# Patient Record
Sex: FEMALE | Race: BLACK OR AFRICAN AMERICAN | Employment: UNEMPLOYED | URBAN - METROPOLITAN AREA
[De-identification: names, ages, dates, MRNs, and addresses within clinical notes are randomized per-mention and may not be internally consistent; named-entity substitution may affect disease eponyms.]

---

## 2017-04-20 ENCOUNTER — OFFICE VISIT (OUTPATIENT)
Dept: OBGYN CLINIC | Facility: CLINIC | Age: 52
End: 2017-04-20

## 2017-04-20 VITALS
WEIGHT: 208 LBS | SYSTOLIC BLOOD PRESSURE: 122 MMHG | BODY MASS INDEX: 34.66 KG/M2 | HEIGHT: 65 IN | DIASTOLIC BLOOD PRESSURE: 74 MMHG

## 2017-04-20 DIAGNOSIS — Z01.419 WOMEN'S ANNUAL ROUTINE GYNECOLOGICAL EXAMINATION: Primary | ICD-10-CM

## 2017-04-20 PROBLEM — N63.20 LEFT BREAST MASS: Status: ACTIVE | Noted: 2017-04-20

## 2017-04-20 PROCEDURE — 88175 CYTOPATH C/V AUTO FLUID REDO: CPT | Performed by: OBSTETRICS & GYNECOLOGY

## 2017-04-20 PROCEDURE — 99396 PREV VISIT EST AGE 40-64: CPT | Performed by: OBSTETRICS & GYNECOLOGY

## 2017-04-20 PROCEDURE — 87624 HPV HI-RISK TYP POOLED RSLT: CPT | Performed by: OBSTETRICS & GYNECOLOGY

## 2017-04-20 RX ORDER — OMEPRAZOLE 40 MG/1
CAPSULE, DELAYED RELEASE ORAL
COMMUNITY
Start: 2017-02-28 | End: 2021-08-30

## 2017-04-20 RX ORDER — HYDROCHLOROTHIAZIDE 25 MG/1
TABLET ORAL
COMMUNITY
Start: 2017-04-09 | End: 2021-08-30

## 2017-04-20 NOTE — PROGRESS NOTES
Valery Obando is here for a checkup. She has no complaints. Patient has not had any vaginal spotting or bleeding since her menopause.   Her bone density scan last year was normal patient has had 2 colonoscopies so far first colonoscopy they found a polyp kaushik Exam     /74 mmHg  Ht 65\"  Wt 208 lb  BMI 34.61 kg/m2  LMP  (Exact Date)    GENERAL: well developed, well nourished, in no apparent distress  SKIN: no rashes, no suspicious lesions  HEENT: normal  NECK: supple; no thyroidmegaly, no adenopathy PM  4/20/2017

## 2017-05-03 ENCOUNTER — MED REC SCAN ONLY (OUTPATIENT)
Dept: OBGYN CLINIC | Facility: CLINIC | Age: 52
End: 2017-05-03

## 2017-08-07 ENCOUNTER — OFFICE VISIT (OUTPATIENT)
Dept: OTOLARYNGOLOGY | Facility: CLINIC | Age: 52
End: 2017-08-07

## 2017-08-07 VITALS
TEMPERATURE: 99 F | SYSTOLIC BLOOD PRESSURE: 127 MMHG | WEIGHT: 200 LBS | BODY MASS INDEX: 33.32 KG/M2 | DIASTOLIC BLOOD PRESSURE: 86 MMHG | HEIGHT: 65 IN

## 2017-08-07 DIAGNOSIS — K11.20 SIALOADENITIS OF SUBMANDIBULAR GLAND: Primary | ICD-10-CM

## 2017-08-07 PROCEDURE — 99212 OFFICE O/P EST SF 10 MIN: CPT | Performed by: OTOLARYNGOLOGY

## 2017-08-07 PROCEDURE — 99243 OFF/OP CNSLTJ NEW/EST LOW 30: CPT | Performed by: OTOLARYNGOLOGY

## 2017-08-07 NOTE — PROGRESS NOTES
Ganesh Anderson is a 46year old female. Patient presents with:  Lump: lump at her neck for a year      HISTORY OF PRESENT ILLNESS  She presents with a history of some thyroid issues with a subsequent blood work demonstrating an elevated ESR.   She was r Abdominal pain and diarrhea. Endocrine Negative Cold intolerance and heat intolerance. Neuro Negative Tremors. Psych Negative Anxiety and depression. Integumentary Negative Frequent skin infections, pigment change and rash.    Hema/Lymph Negative Ea Release, , Disp: , Rfl:   •  hydrochlorothiazide 25 MG Oral Tab, , Disp: , Rfl:   •  Cholecalciferol ( ULTRA STRENGTH) 2000 units Oral Cap, Take 2,000 Units by mouth., Disp: , Rfl:   ASSESSMENT AND PLAN    1.  Sialoadenitis of submandibular gland  I di

## 2017-08-07 NOTE — PATIENT INSTRUCTIONS
Salivary Gland Swelling, Uncertain Cause  Salivary glands make saliva in response to food in your mouth. Saliva is mostly water. It also has minerals and proteins that help break down food and keep the mouth and teeth healthy.  There are three pairs of sa Follow up with your healthcare provider or as advised. See your healthcare provider for further exams and testing. If you have been referred to a specialist, make an appointment promptly.   When to seek medical advice  Call your healthcare provider if any o

## 2017-08-08 ENCOUNTER — TELEPHONE (OUTPATIENT)
Dept: OTOLARYNGOLOGY | Facility: CLINIC | Age: 52
End: 2017-08-08

## 2017-08-08 NOTE — TELEPHONE ENCOUNTER
Pt dropped off disc CT of neck w/o contrast & MRI of lumbar spine w/ without contrast. Both disc placed in JDO mailbox @ [facility].

## 2017-10-27 ENCOUNTER — TELEPHONE (OUTPATIENT)
Dept: OTOLARYNGOLOGY | Facility: CLINIC | Age: 52
End: 2017-10-27

## 2017-10-27 NOTE — TELEPHONE ENCOUNTER
appt 8-7-17 with dr Patricia Roth. Pt dropped off a cd with results of the cat scan. Pt had the blood test.   Office was calling dr Humaira Barlow for results. Office would call pt after all results were received. Pt has not received a call back.   Why?

## 2017-10-30 NOTE — TELEPHONE ENCOUNTER
Janet Clay, please see note below, do you receive pt information at [facility] back in August. Please advise

## 2017-10-30 NOTE — TELEPHONE ENCOUNTER
You should ask Macarena if the CDs are in the drawer they have there. I do not recall seeing them or having them shown to me by the nurse at [facility].   I do not even know where I HAVE A BOX SO ALL SCANS LEFT BY PATIENTS ARE MANAGED BY THE NURSING STAFF. iF SCANS A

## 2017-11-04 NOTE — TELEPHONE ENCOUNTER
I reviewed her scan images, her ultrasound and CT reports which suggest some lymphadenopathy which is completely benign in appearance on the left side and all of her labs appeared to be rather normal.  Previous biopsy of her thyroid nodule was benign.   If

## 2017-11-06 NOTE — TELEPHONE ENCOUNTER
Pt informed of results and recommendations per JDO. Letter faxed to Dr. Daniel Lopez per pt's request.  Pt verbalized understanding.

## 2018-08-17 ENCOUNTER — OFFICE VISIT (OUTPATIENT)
Dept: OBGYN CLINIC | Facility: CLINIC | Age: 53
End: 2018-08-17
Payer: COMMERCIAL

## 2018-08-17 VITALS
WEIGHT: 216 LBS | HEIGHT: 65 IN | BODY MASS INDEX: 35.99 KG/M2 | DIASTOLIC BLOOD PRESSURE: 74 MMHG | SYSTOLIC BLOOD PRESSURE: 122 MMHG

## 2018-08-17 DIAGNOSIS — Z01.419 WOMEN'S ANNUAL ROUTINE GYNECOLOGICAL EXAMINATION: Primary | ICD-10-CM

## 2018-08-17 PROBLEM — K63.5 COLON POLYP: Status: ACTIVE | Noted: 2018-08-17

## 2018-08-17 PROBLEM — Z78.0 MENOPAUSE: Status: ACTIVE | Noted: 2018-08-17

## 2018-08-17 PROCEDURE — 99396 PREV VISIT EST AGE 40-64: CPT | Performed by: OBSTETRICS & GYNECOLOGY

## 2018-08-17 PROCEDURE — 87624 HPV HI-RISK TYP POOLED RSLT: CPT | Performed by: OBSTETRICS & GYNECOLOGY

## 2018-08-17 NOTE — PROGRESS NOTES
Venkat Cisneros is here for checkup. She has no gynecologic complaints. Patient says that occasionally she gets lower abdominal pain and she checked with her primary care physician a month ago and she feels it may be bowel related, constipation.   She denies any Years of education: N/A  Number of children: N/A     Occupational History  None on file     Social History Main Topics   Smoking status: Current Every Day Smoker     Smokeless tobacco: Current User    Alcohol use No    Drug use: Unknown     Other Topics Co

## 2018-08-20 ENCOUNTER — OFFICE VISIT (OUTPATIENT)
Dept: OTOLARYNGOLOGY | Facility: CLINIC | Age: 53
End: 2018-08-20
Payer: COMMERCIAL

## 2018-08-20 VITALS
HEIGHT: 65 IN | TEMPERATURE: 98 F | DIASTOLIC BLOOD PRESSURE: 73 MMHG | SYSTOLIC BLOOD PRESSURE: 110 MMHG | WEIGHT: 218 LBS | BODY MASS INDEX: 36.32 KG/M2

## 2018-08-20 DIAGNOSIS — J35.1 TONSILLAR HYPERTROPHY: Primary | ICD-10-CM

## 2018-08-20 LAB — HPV I/H RISK 1 DNA SPEC QL NAA+PROBE: NEGATIVE

## 2018-08-20 PROCEDURE — 99214 OFFICE O/P EST MOD 30 MIN: CPT | Performed by: OTOLARYNGOLOGY

## 2018-08-20 PROCEDURE — 99212 OFFICE O/P EST SF 10 MIN: CPT | Performed by: OTOLARYNGOLOGY

## 2018-08-20 RX ORDER — LORATADINE 10 MG/1
10 TABLET ORAL DAILY
Qty: 30 TABLET | Refills: 3 | Status: SHIPPED | OUTPATIENT
Start: 2018-08-20 | End: 2021-06-16

## 2018-08-20 RX ORDER — AZELASTINE 1 MG/ML
2 SPRAY, METERED NASAL 2 TIMES DAILY
Qty: 1 BOTTLE | Refills: 0 | Status: SHIPPED | OUTPATIENT
Start: 2018-08-20 | End: 2021-06-16

## 2018-08-20 RX ORDER — MONTELUKAST SODIUM 10 MG/1
10 TABLET ORAL NIGHTLY
Qty: 30 TABLET | Refills: 3 | Status: SHIPPED | OUTPATIENT
Start: 2018-08-20 | End: 2019-07-23

## 2018-08-20 NOTE — PROGRESS NOTES
Monika Cleaning is a 45year old female. Patient presents with: Tonsil Problem: enlarged tonsils       HISTORY OF PRESENT ILLNESS  She presents with a history of some thyroid issues with a subsequent blood work demonstrating an elevated ESR.   She was r Drug use:  No                Family History   Problem Relation Age of Onset   • Hypertension Father    • Diabetes Mother    • Hypertension Mother        Past Medical History:   Diagnosis Date   • Essential hypertension    • Fibroids    • H/O pino Normal.   Skin Normal Inspection - Normal.        Lymph Detail Normal Submental. Submandibular. Anterior cervical. Posterior cervical. Supraclavicular.         Nose/Mouth/Throat Normal External nose - Normal. Lips/teeth/gums - Normal. Tonsils -3+ oropharynx

## 2018-12-24 ENCOUNTER — TELEPHONE (OUTPATIENT)
Dept: OTOLARYNGOLOGY | Facility: CLINIC | Age: 53
End: 2018-12-24

## 2018-12-24 NOTE — TELEPHONE ENCOUNTER
Dr Estuardo Coleman please see note below,  last visit on 8/20/18 for tonsillar hypertrophy  Request for refill,pt is due for follow up,please advise.

## 2018-12-24 NOTE — TELEPHONE ENCOUNTER
•  Montelukast Sodium 10 MG Oral Tab, Take 1 tablet (10 mg total) by mouth nightly., Disp: 30 tablet, Rfl: 3    RX refill received from [address] [place] [address]: 8-20-18

## 2018-12-26 RX ORDER — MONTELUKAST SODIUM 10 MG/1
10 TABLET ORAL NIGHTLY
Qty: 30 TABLET | Refills: 3 | Status: SHIPPED | OUTPATIENT
Start: 2018-12-26 | End: 2019-07-23

## 2019-07-23 ENCOUNTER — OFFICE VISIT (OUTPATIENT)
Dept: OBGYN CLINIC | Facility: CLINIC | Age: 54
End: 2019-07-23
Payer: COMMERCIAL

## 2019-07-23 VITALS
SYSTOLIC BLOOD PRESSURE: 116 MMHG | BODY MASS INDEX: 37.65 KG/M2 | WEIGHT: 226 LBS | HEIGHT: 65 IN | DIASTOLIC BLOOD PRESSURE: 70 MMHG

## 2019-07-23 DIAGNOSIS — Z01.419 WOMEN'S ANNUAL ROUTINE GYNECOLOGICAL EXAMINATION: Primary | ICD-10-CM

## 2019-07-23 DIAGNOSIS — R10.2 PELVIC PAIN IN FEMALE: ICD-10-CM

## 2019-07-23 PROCEDURE — 99396 PREV VISIT EST AGE 40-64: CPT | Performed by: OBSTETRICS & GYNECOLOGY

## 2019-07-23 PROCEDURE — 87624 HPV HI-RISK TYP POOLED RSLT: CPT | Performed by: OBSTETRICS & GYNECOLOGY

## 2019-07-23 RX ORDER — LINACLOTIDE 145 UG/1
CAPSULE, GELATIN COATED ORAL
Refills: 0 | COMMUNITY
Start: 2019-07-15 | End: 2021-08-30

## 2019-07-23 RX ORDER — B-COMPLEX WITH VITAMIN C
1 TABLET ORAL
COMMUNITY

## 2019-07-24 LAB — HPV I/H RISK 1 DNA SPEC QL NAA+PROBE: NEGATIVE

## 2019-07-29 ENCOUNTER — TELEPHONE (OUTPATIENT)
Dept: OBGYN CLINIC | Facility: CLINIC | Age: 54
End: 2019-07-29

## 2019-08-06 NOTE — TELEPHONE ENCOUNTER
Patient last visit on 8/20/18 tonsillar hypertrophy request for refll ,last note pt is due for follow up,please advise.

## 2019-08-09 RX ORDER — MONTELUKAST SODIUM 10 MG/1
TABLET ORAL
Qty: 30 TABLET | Refills: 0 | OUTPATIENT
Start: 2019-08-09

## 2020-02-08 ENCOUNTER — HOSPITAL ENCOUNTER (OUTPATIENT)
Age: 55
Discharge: HOME | End: 2020-02-08
Attending: FAMILY MEDICINE
Payer: COMMERCIAL

## 2020-02-08 ENCOUNTER — APPOINTMENT (OUTPATIENT)
Dept: GENERAL RADIOLOGY | Age: 55
End: 2020-02-08
Attending: FAMILY MEDICINE
Payer: COMMERCIAL

## 2020-02-08 VITALS
DIASTOLIC BLOOD PRESSURE: 76 MMHG | TEMPERATURE: 100 F | HEART RATE: 117 BPM | SYSTOLIC BLOOD PRESSURE: 129 MMHG | OXYGEN SATURATION: 100 % | RESPIRATION RATE: 16 BRPM

## 2020-02-08 DIAGNOSIS — J11.1 INFLUENZA: Primary | ICD-10-CM

## 2020-02-08 LAB
POCT INFLUENZA A: POSITIVE
POCT INFLUENZA B: NEGATIVE

## 2020-02-08 PROCEDURE — 99204 OFFICE O/P NEW MOD 45 MIN: CPT

## 2020-02-08 PROCEDURE — 71046 X-RAY EXAM CHEST 2 VIEWS: CPT | Performed by: FAMILY MEDICINE

## 2020-02-08 PROCEDURE — 87502 INFLUENZA DNA AMP PROBE: CPT | Performed by: FAMILY MEDICINE

## 2020-02-08 PROCEDURE — 99213 OFFICE O/P EST LOW 20 MIN: CPT

## 2020-02-08 RX ORDER — OSELTAMIVIR PHOSPHATE 75 MG/1
75 CAPSULE ORAL 2 TIMES DAILY
Qty: 10 CAPSULE | Refills: 0 | Status: SHIPPED | OUTPATIENT
Start: 2020-02-08 | End: 2020-02-13

## 2020-02-08 NOTE — ED PROVIDER NOTES
Patient Seen in: [address]      History   Patient presents with:  Cough/URI    Stated Complaint: cold symps, bodyache     HPI    62yo F presents to IC with a cough x 1 week and myalgias and rhinorrhea yesterday.   Deidre Silverio Left Ear: Tympanic membrane and ear canal normal.      Nose: Mucosal edema, congestion and rhinorrhea present. Right Sinus: No maxillary sinus tenderness or frontal sinus tenderness.       Left Sinus: No maxillary sinus tenderness or frontal sinus tend CARDIAC/MEDIAST: Borderline cardiomegaly.  No significant vascular congestion. LUNGS/PLEURA: Mild linear opacity in the left lung base.  No pleural effusion.  No focal opacity in the right lung.    BONES: Osseous structures are intact. OTHER: Negative.

## 2020-02-08 NOTE — ED NOTES
Pt discharged home, all questions were answered, advised patient to start tamiflu today , to drink plenty of fluids and to follow up with pcp is symptoms not improving. Pt agreeable.

## 2020-08-17 ENCOUNTER — OFFICE VISIT (OUTPATIENT)
Dept: OBGYN CLINIC | Facility: CLINIC | Age: 55
End: 2020-08-17
Payer: COMMERCIAL

## 2020-08-17 VITALS
WEIGHT: 220.63 LBS | BODY MASS INDEX: 36.76 KG/M2 | SYSTOLIC BLOOD PRESSURE: 120 MMHG | DIASTOLIC BLOOD PRESSURE: 60 MMHG | HEIGHT: 65 IN

## 2020-08-17 DIAGNOSIS — Z01.419 WOMEN'S ANNUAL ROUTINE GYNECOLOGICAL EXAMINATION: Primary | ICD-10-CM

## 2020-08-17 DIAGNOSIS — Z12.4 SCREENING FOR MALIGNANT NEOPLASM OF THE CERVIX: ICD-10-CM

## 2020-08-17 PROBLEM — E04.2 MULTINODULAR GOITER: Status: ACTIVE | Noted: 2017-02-15

## 2020-08-17 PROBLEM — I10 ESSENTIAL HYPERTENSION: Status: ACTIVE | Noted: 2017-11-18

## 2020-08-17 PROCEDURE — 3008F BODY MASS INDEX DOCD: CPT | Performed by: OBSTETRICS & GYNECOLOGY

## 2020-08-17 PROCEDURE — 3078F DIAST BP <80 MM HG: CPT | Performed by: OBSTETRICS & GYNECOLOGY

## 2020-08-17 PROCEDURE — 3074F SYST BP LT 130 MM HG: CPT | Performed by: OBSTETRICS & GYNECOLOGY

## 2020-08-17 PROCEDURE — 87624 HPV HI-RISK TYP POOLED RSLT: CPT | Performed by: OBSTETRICS & GYNECOLOGY

## 2020-08-17 PROCEDURE — 99396 PREV VISIT EST AGE 40-64: CPT | Performed by: OBSTETRICS & GYNECOLOGY

## 2020-08-17 RX ORDER — MULTIVIT-MIN/IRON/FOLIC ACID/K 18-600-40
500 CAPSULE ORAL DAILY
COMMUNITY

## 2020-08-17 NOTE — PROGRESS NOTES
James Mary is here for a checkup. She has no gynecologic complaints. She has not had any vaginal spotting or bleeding. Her mammogram end of last year was normal given a prescription for mammogram this year.   She is not due for colonoscopy for another 5 ye 8/17/2020 ) 30 tablet 3   • Azelastine HCl 0.1 % Nasal Solution 2 sprays by Nasal route 2 (two) times daily.  (Patient not taking: Reported on 8/17/2020 ) 1 Bottle 0       Family History     Family History   Problem Relation Age of Onset   • Hypertension Fa rashes, no suspicious lesions  HEENT: normal  NECK: supple; no thyroidmegaly, no adenopathy  LUNGS: clear to auscultation  CARDIOVASCULAR: normal S1, S2, RRR  BREASTS: Bilaterally normal firm, nontendder, no palpable masses or nodes, no nipple discharge, n

## 2020-08-18 LAB — HPV I/H RISK 1 DNA SPEC QL NAA+PROBE: NEGATIVE

## 2020-11-30 ENCOUNTER — TELEPHONE (OUTPATIENT)
Dept: FAMILY MEDICINE CLINIC | Facility: CLINIC | Age: 55
End: 2020-11-30

## 2021-06-16 ENCOUNTER — MEDICAL CORRESPONDENCE (OUTPATIENT)
Dept: OBGYN CLINIC | Facility: CLINIC | Age: 56
End: 2021-06-16

## 2021-06-16 ENCOUNTER — MED REC SCAN ONLY (OUTPATIENT)
Dept: OBGYN CLINIC | Facility: CLINIC | Age: 56
End: 2021-06-16

## 2021-06-16 ENCOUNTER — OFFICE VISIT (OUTPATIENT)
Dept: OBGYN CLINIC | Facility: CLINIC | Age: 56
End: 2021-06-16

## 2021-06-16 VITALS
DIASTOLIC BLOOD PRESSURE: 72 MMHG | SYSTOLIC BLOOD PRESSURE: 122 MMHG | BODY MASS INDEX: 37.32 KG/M2 | WEIGHT: 224 LBS | HEIGHT: 65 IN

## 2021-06-16 DIAGNOSIS — Z01.419 WOMEN'S ANNUAL ROUTINE GYNECOLOGICAL EXAMINATION: Primary | ICD-10-CM

## 2021-06-16 PROBLEM — G47.33 OBSTRUCTIVE SLEEP APNEA (ADULT) (PEDIATRIC): Status: ACTIVE | Noted: 2017-09-14

## 2021-06-16 PROCEDURE — 87624 HPV HI-RISK TYP POOLED RSLT: CPT | Performed by: OBSTETRICS & GYNECOLOGY

## 2021-06-16 PROCEDURE — 3078F DIAST BP <80 MM HG: CPT | Performed by: OBSTETRICS & GYNECOLOGY

## 2021-06-16 PROCEDURE — 88175 CYTOPATH C/V AUTO FLUID REDO: CPT | Performed by: OBSTETRICS & GYNECOLOGY

## 2021-06-16 PROCEDURE — 3008F BODY MASS INDEX DOCD: CPT | Performed by: OBSTETRICS & GYNECOLOGY

## 2021-06-16 PROCEDURE — 99396 PREV VISIT EST AGE 40-64: CPT | Performed by: OBSTETRICS & GYNECOLOGY

## 2021-06-16 PROCEDURE — 3074F SYST BP LT 130 MM HG: CPT | Performed by: OBSTETRICS & GYNECOLOGY

## 2021-06-16 RX ORDER — FERROUS SULFATE 325(65) MG
325 TABLET ORAL DAILY
COMMUNITY
End: 2021-08-30

## 2021-06-16 NOTE — PROGRESS NOTES
Mary Calixto is here for a checkup. She has no gynecologic complaints. She has not had any vaginal spotting or bleeding. Patient says that she had mammogram at [facility] in October of last year.   She is scheduled to have mammogram at [facility] • Diabetes Mother    • Hypertension Mother        Social History     Social History    Socioeconomic History      Marital status: Single      Spouse name: Not on file      Number of children: Not on file      Years of education: Not on file      Highest nontendder, no palpable masses or nodes, no nipple discharge, no skin changes, no axillary adenopathy  ABDOMEN: Soft, non distended; non tender, no masses  GYNE/: External Genitalia: Normal appearing, no lesions.  Urethral meatus appear wnl, no abnormal d

## 2021-06-21 ENCOUNTER — APPOINTMENT (OUTPATIENT)
Dept: GENERAL RADIOLOGY | Age: 56
End: 2021-06-21
Attending: NURSE PRACTITIONER
Payer: COMMERCIAL

## 2021-06-21 ENCOUNTER — HOSPITAL ENCOUNTER (OUTPATIENT)
Age: 56
Discharge: HOME | End: 2021-06-21
Payer: COMMERCIAL

## 2021-06-21 VITALS
SYSTOLIC BLOOD PRESSURE: 145 MMHG | TEMPERATURE: 98 F | HEART RATE: 71 BPM | RESPIRATION RATE: 17 BRPM | DIASTOLIC BLOOD PRESSURE: 77 MMHG | OXYGEN SATURATION: 100 %

## 2021-06-21 DIAGNOSIS — S46.911A SHOULDER STRAIN, RIGHT, INITIAL ENCOUNTER: Primary | ICD-10-CM

## 2021-06-21 PROCEDURE — 73030 X-RAY EXAM OF SHOULDER: CPT | Performed by: NURSE PRACTITIONER

## 2021-06-21 PROCEDURE — 99203 OFFICE O/P NEW LOW 30 MIN: CPT | Performed by: NURSE PRACTITIONER

## 2021-06-21 NOTE — ED PROVIDER NOTES
Patient Seen in: [facility]    History   CC: shoulder pain  HPI: Rayo North 60year old female  who presents c/o right-sided shoulder pain which has been intermittent in severity in nature status post injury 5 months ago in which she tr Cholecalciferol ( ULTRA STRENGTH) 2000 units Oral Cap,  Take 2,000 Units by mouth. Constitutional and vital signs reviewed.         Physical Exam     ED Triage Vitals [06/21/21 1624]   /77   Pulse 71   Resp 17   Temp 97.8 °F (36.6 °C) Interactive and appropriate      ED Course   Labs Reviewed - No data to display    MDM     XR SHOULDER, COMPLETE (MIN 2 VIEWS), RIGHT (CPT=73030) (Final result)  Result time 06/21/21 17:14:40  Final result by Erinn Casey MD (06/21/21 17:14:40) in 2 days        Medications Prescribed:  Current Discharge Medication List

## 2021-06-21 NOTE — ED INITIAL ASSESSMENT (HPI)
Pt here with complaints of right shoulder pain,pt states she had a fall 5 months ago where she tripped and landed on her right shoulder pt states she was fine then but now has pain when moving her shoulder and has limited rom and feels it has worsened this

## 2021-08-30 ENCOUNTER — LAB ENCOUNTER (OUTPATIENT)
Dept: LAB | Facility: REFERENCE LAB | Age: 56
End: 2021-08-30
Attending: FAMILY MEDICINE
Payer: COMMERCIAL

## 2021-08-30 ENCOUNTER — LAB ENCOUNTER (OUTPATIENT)
Dept: LAB | Age: 56
End: 2021-08-30
Attending: FAMILY MEDICINE
Payer: COMMERCIAL

## 2021-08-30 ENCOUNTER — OFFICE VISIT (OUTPATIENT)
Dept: FAMILY MEDICINE CLINIC | Facility: CLINIC | Age: 56
End: 2021-08-30

## 2021-08-30 VITALS
OXYGEN SATURATION: 99 % | HEART RATE: 73 BPM | BODY MASS INDEX: 36.99 KG/M2 | SYSTOLIC BLOOD PRESSURE: 132 MMHG | DIASTOLIC BLOOD PRESSURE: 80 MMHG | HEIGHT: 65 IN | WEIGHT: 222 LBS

## 2021-08-30 DIAGNOSIS — Z00.00 ENCOUNTER FOR ROUTINE ADULT HEALTH EXAMINATION WITHOUT ABNORMAL FINDINGS: Primary | ICD-10-CM

## 2021-08-30 DIAGNOSIS — I10 ESSENTIAL HYPERTENSION: ICD-10-CM

## 2021-08-30 DIAGNOSIS — E04.2 MULTINODULAR GOITER: ICD-10-CM

## 2021-08-30 DIAGNOSIS — Z23 NEED FOR VACCINATION: ICD-10-CM

## 2021-08-30 DIAGNOSIS — Z00.00 ROUTINE GENERAL MEDICAL EXAMINATION AT A HEALTH CARE FACILITY: Primary | ICD-10-CM

## 2021-08-30 DIAGNOSIS — E87.6 HYPOKALEMIA: ICD-10-CM

## 2021-08-30 DIAGNOSIS — Z00.00 ENCOUNTER FOR ROUTINE ADULT HEALTH EXAMINATION WITHOUT ABNORMAL FINDINGS: ICD-10-CM

## 2021-08-30 LAB
ALBUMIN SERPL-MCNC: 3.7 G/DL (ref 3.4–5)
ALBUMIN/GLOB SERPL: 0.9 {RATIO} (ref 1–2)
ALP LIVER SERPL-CCNC: 80 U/L
ALT SERPL-CCNC: 29 U/L
ANION GAP SERPL CALC-SCNC: 7 MMOL/L (ref 0–18)
AST SERPL-CCNC: 13 U/L (ref 15–37)
BASOPHILS # BLD AUTO: 0.05 X10(3) UL (ref 0–0.2)
BASOPHILS NFR BLD AUTO: 0.7 %
BILIRUB SERPL-MCNC: 0.6 MG/DL (ref 0.1–2)
BUN BLD-MCNC: 8 MG/DL (ref 7–18)
BUN/CREAT SERPL: 11 (ref 10–20)
CALCIUM BLD-MCNC: 9.1 MG/DL (ref 8.5–10.1)
CHLORIDE SERPL-SCNC: 111 MMOL/L (ref 98–112)
CHOLEST SMN-MCNC: 190 MG/DL (ref ?–200)
CO2 SERPL-SCNC: 24 MMOL/L (ref 21–32)
CREAT BLD-MCNC: 0.73 MG/DL
CREAT UR-SCNC: 117 MG/DL
DEPRECATED RDW RBC AUTO: 48.9 FL (ref 35.1–46.3)
EOSINOPHIL # BLD AUTO: 0.1 X10(3) UL (ref 0–0.7)
EOSINOPHIL NFR BLD AUTO: 1.5 %
ERYTHROCYTE [DISTWIDTH] IN BLOOD BY AUTOMATED COUNT: 14.7 % (ref 11–15)
EST. AVERAGE GLUCOSE BLD GHB EST-MCNC: 120 MG/DL (ref 68–126)
GLOBULIN PLAS-MCNC: 4.2 G/DL (ref 2.8–4.4)
GLUCOSE BLD-MCNC: 92 MG/DL (ref 70–99)
HBA1C MFR BLD HPLC: 5.8 % (ref ?–5.7)
HCT VFR BLD AUTO: 37.9 %
HCV AB SERPL QL IA: NONREACTIVE
HDLC SERPL-MCNC: 41 MG/DL (ref 40–59)
HGB BLD-MCNC: 12.6 G/DL
IMM GRANULOCYTES # BLD AUTO: 0.01 X10(3) UL (ref 0–1)
IMM GRANULOCYTES NFR BLD: 0.1 %
LDLC SERPL CALC-MCNC: 131 MG/DL (ref ?–100)
LYMPHOCYTES # BLD AUTO: 3.08 X10(3) UL (ref 1–4)
LYMPHOCYTES NFR BLD AUTO: 44.8 %
M PROTEIN MFR SERPL ELPH: 7.9 G/DL (ref 6.4–8.2)
MCH RBC QN AUTO: 30.2 PG (ref 26–34)
MCHC RBC AUTO-ENTMCNC: 33.2 G/DL (ref 31–37)
MCV RBC AUTO: 90.9 FL
MICROALBUMIN UR-MCNC: 0.98 MG/DL
MICROALBUMIN/CREAT 24H UR-RTO: 8.4 UG/MG (ref ?–30)
MONOCYTES # BLD AUTO: 0.58 X10(3) UL (ref 0.1–1)
MONOCYTES NFR BLD AUTO: 8.4 %
NEUTROPHILS # BLD AUTO: 3.06 X10 (3) UL (ref 1.5–7.7)
NEUTROPHILS # BLD AUTO: 3.06 X10(3) UL (ref 1.5–7.7)
NEUTROPHILS NFR BLD AUTO: 44.5 %
NONHDLC SERPL-MCNC: 149 MG/DL (ref ?–130)
OSMOLALITY SERPL CALC.SUM OF ELEC: 292 MOSM/KG (ref 275–295)
PATIENT FASTING Y/N/NP: YES
PATIENT FASTING Y/N/NP: YES
PLATELET # BLD AUTO: 372 10(3)UL (ref 150–450)
POTASSIUM SERPL-SCNC: 3.2 MMOL/L (ref 3.5–5.1)
RBC # BLD AUTO: 4.17 X10(6)UL
SODIUM SERPL-SCNC: 142 MMOL/L (ref 136–145)
TRIGL SERPL-MCNC: 98 MG/DL (ref 30–149)
VLDLC SERPL CALC-MCNC: 18 MG/DL (ref 0–30)
WBC # BLD AUTO: 6.9 X10(3) UL (ref 4–11)

## 2021-08-30 PROCEDURE — 90471 IMMUNIZATION ADMIN: CPT | Performed by: FAMILY MEDICINE

## 2021-08-30 PROCEDURE — 90472 IMMUNIZATION ADMIN EACH ADD: CPT | Performed by: FAMILY MEDICINE

## 2021-08-30 PROCEDURE — 90732 PPSV23 VACC 2 YRS+ SUBQ/IM: CPT | Performed by: FAMILY MEDICINE

## 2021-08-30 PROCEDURE — 36415 COLL VENOUS BLD VENIPUNCTURE: CPT

## 2021-08-30 PROCEDURE — 90750 HZV VACC RECOMBINANT IM: CPT | Performed by: FAMILY MEDICINE

## 2021-08-30 PROCEDURE — 83036 HEMOGLOBIN GLYCOSYLATED A1C: CPT

## 2021-08-30 PROCEDURE — 3079F DIAST BP 80-89 MM HG: CPT | Performed by: FAMILY MEDICINE

## 2021-08-30 PROCEDURE — 86803 HEPATITIS C AB TEST: CPT

## 2021-08-30 PROCEDURE — 93005 ELECTROCARDIOGRAM TRACING: CPT

## 2021-08-30 PROCEDURE — 90715 TDAP VACCINE 7 YRS/> IM: CPT | Performed by: FAMILY MEDICINE

## 2021-08-30 PROCEDURE — G0438 PPPS, INITIAL VISIT: HCPCS | Performed by: FAMILY MEDICINE

## 2021-08-30 PROCEDURE — 3075F SYST BP GE 130 - 139MM HG: CPT | Performed by: FAMILY MEDICINE

## 2021-08-30 PROCEDURE — 93010 ELECTROCARDIOGRAM REPORT: CPT | Performed by: FAMILY MEDICINE

## 2021-08-30 PROCEDURE — 80053 COMPREHEN METABOLIC PANEL: CPT

## 2021-08-30 PROCEDURE — 99386 PREV VISIT NEW AGE 40-64: CPT | Performed by: FAMILY MEDICINE

## 2021-08-30 PROCEDURE — 82570 ASSAY OF URINE CREATININE: CPT

## 2021-08-30 PROCEDURE — 82043 UR ALBUMIN QUANTITATIVE: CPT

## 2021-08-30 PROCEDURE — 3008F BODY MASS INDEX DOCD: CPT | Performed by: FAMILY MEDICINE

## 2021-08-30 PROCEDURE — 85025 COMPLETE CBC W/AUTO DIFF WBC: CPT

## 2021-08-30 PROCEDURE — 80061 LIPID PANEL: CPT

## 2021-08-30 RX ORDER — POTASSIUM CHLORIDE 1500 MG/1
20 TABLET, FILM COATED, EXTENDED RELEASE ORAL DAILY
Qty: 90 TABLET | Refills: 1 | Status: SHIPPED | OUTPATIENT
Start: 2021-08-30

## 2021-08-30 RX ORDER — OMEPRAZOLE 40 MG/1
40 CAPSULE, DELAYED RELEASE ORAL DAILY
Qty: 90 CAPSULE | Refills: 1 | Status: SHIPPED | OUTPATIENT
Start: 2021-08-30

## 2021-08-30 RX ORDER — HYDROCHLOROTHIAZIDE 25 MG/1
25 TABLET ORAL DAILY
Qty: 90 TABLET | Refills: 1 | Status: SHIPPED | OUTPATIENT
Start: 2021-08-30

## 2021-08-30 NOTE — PATIENT INSTRUCTIONS

## 2021-08-30 NOTE — PROGRESS NOTES
HPI:   Mary Anne Landa is a 60year old female who presents for a complete physical exam.     Last pap: 6/2021 and normal   Last mammogram: 10/2020 at [facility]    Previous colonoscopy:  Within the last 10 years    History of STD's: None  History o No Known Allergies   Past Medical History:   Diagnosis Date   • Essential hypertension    • Fibroids    • H/O bone density study       Past Surgical History:   Procedure Laterality Date   • BREAST BIOPSY Left    • FOOT SURGERY Bilateral       Family Hi goiter  Need for vaccination  Orders Placed This Encounter      Microalb/Creat Ratio, Random Urine [E]      Lipid Panel [E]      Comp Metabolic Panel (14) [E]      Hemoglobin A1C (Glycohemoglobin) [E]      HCV Antibody [E]      CBC W Differential W Platele hydroCHLOROthiazide 25 MG Oral Tab 90 tablet 1     Sig: Take 1 tablet (25 mg total) by mouth daily. • Omeprazole 40 MG Oral Capsule Delayed Release 90 capsule 1     Sig: Take 1 capsule (40 mg total) by mouth daily.        Imaging & Consults:  [facility]OSTER VACC

## 2021-09-01 ENCOUNTER — HOSPITAL ENCOUNTER (OUTPATIENT)
Dept: ULTRASOUND IMAGING | Age: 56
Discharge: HOME | End: 2021-09-01
Attending: FAMILY MEDICINE
Payer: COMMERCIAL

## 2021-09-01 ENCOUNTER — MED REC SCAN ONLY (OUTPATIENT)
Dept: FAMILY MEDICINE CLINIC | Facility: CLINIC | Age: 56
End: 2021-09-01

## 2021-09-01 DIAGNOSIS — E04.2 MULTINODULAR GOITER: ICD-10-CM

## 2021-09-01 PROCEDURE — 76536 US EXAM OF HEAD AND NECK: CPT | Performed by: FAMILY MEDICINE

## 2021-09-02 ENCOUNTER — TELEPHONE (OUTPATIENT)
Dept: OBGYN CLINIC | Facility: CLINIC | Age: 56
End: 2021-09-02

## 2021-09-02 NOTE — TELEPHONE ENCOUNTER
This RN called pt back. Pt experiences vag bleeding when wiping after urination. Pt denies UTI symptoms. This RN scheduled pt with JN tomorrow (per Sheldon Harada ok to give SDA). Pt verbalized understanding and agrees with ROMA.     Ned Arora  to Dori Brown,

## 2021-09-02 NOTE — TELEPHONE ENCOUNTER
Patient was seen Monday and patient is having some blood in urine. Having blood now when she wipes. Patient sent [employer] message.

## 2021-09-03 ENCOUNTER — OFFICE VISIT (OUTPATIENT)
Dept: OBGYN CLINIC | Facility: CLINIC | Age: 56
End: 2021-09-03

## 2021-09-03 VITALS
SYSTOLIC BLOOD PRESSURE: 130 MMHG | BODY MASS INDEX: 36.49 KG/M2 | HEIGHT: 65 IN | WEIGHT: 219 LBS | DIASTOLIC BLOOD PRESSURE: 90 MMHG

## 2021-09-03 DIAGNOSIS — R10.2 PELVIC PAIN: ICD-10-CM

## 2021-09-03 DIAGNOSIS — N95.0 PMB (POSTMENOPAUSAL BLEEDING): Primary | ICD-10-CM

## 2021-09-03 PROCEDURE — 3075F SYST BP GE 130 - 139MM HG: CPT | Performed by: OBSTETRICS & GYNECOLOGY

## 2021-09-03 PROCEDURE — 88305 TISSUE EXAM BY PATHOLOGIST: CPT | Performed by: OBSTETRICS & GYNECOLOGY

## 2021-09-03 PROCEDURE — 58100 BIOPSY OF UTERUS LINING: CPT | Performed by: OBSTETRICS & GYNECOLOGY

## 2021-09-03 PROCEDURE — 99214 OFFICE O/P EST MOD 30 MIN: CPT | Performed by: OBSTETRICS & GYNECOLOGY

## 2021-09-03 PROCEDURE — 3080F DIAST BP >= 90 MM HG: CPT | Performed by: OBSTETRICS & GYNECOLOGY

## 2021-09-03 PROCEDURE — 3008F BODY MASS INDEX DOCD: CPT | Performed by: OBSTETRICS & GYNECOLOGY

## 2021-09-03 NOTE — PROGRESS NOTES
[facility]  Obstetrics and Gynecology  Follow Up    Subjective:     Singh Mccarthy is a 60year old [facility] female who presents with c/o Patient presents with:  Vaginal Bleeding: vag bleeding afterurination     First saw b of motion, strength 5/5, nontender without edema    Labs:  Reviewed normal paps from 2017 annually since.         Assessment:     Julio Cesar Umanzor is a 60year old  female who presents for PMB     Patient Active Problem List:     Left breast mass

## 2021-09-07 NOTE — PROCEDURES
Endometrial Biopsy    Pre-Procedure Care:   Consent was obtained. Procedure/risks were explained. Questions were answered. Correct patient was identified. Correct side and site were confirmed.     Description of Procedure:  Under satisfactory analgesia,

## 2021-09-17 ENCOUNTER — TELEPHONE (OUTPATIENT)
Dept: FAMILY MEDICINE CLINIC | Facility: CLINIC | Age: 56
End: 2021-09-17

## 2021-09-17 NOTE — TELEPHONE ENCOUNTER
Per pt, Otoniel Iniguez asked that she retrieve records from [facility].     Per pt, request for release of information needs to be sent to [place] [address] records department with the following information:    STAT MARIS    Name, , Info Needed, Provider Name, Thyroid US R

## 2021-09-20 NOTE — TELEPHONE ENCOUNTER
This RN explained to pt that medical records release form needs to be completed and faxed to [facility].  This RN emailed forms to [email or web address]; advised pt to send completed and signed fax via [employer] since pt is out of town and does not have access to a

## 2021-09-20 NOTE — TELEPHONE ENCOUNTER
Patient is calling back stating that she does not need to a sigh a release form. She stated that she did speak to someone in medical records. Can you please call back me back?

## 2021-10-04 NOTE — TELEPHONE ENCOUNTER
Rn received ultrasound records of abdomen from [facility]. RN had requested ultrasound of thyroid. RN attempted to contact number on return fax, no answer. RN contacted pt and informed of need to get images from ultrasound.  Pt states she is still out of to

## 2021-10-26 ENCOUNTER — MED REC SCAN ONLY (OUTPATIENT)
Dept: FAMILY MEDICINE CLINIC | Facility: CLINIC | Age: 56
End: 2021-10-26

## 2021-10-26 ENCOUNTER — NURSE ONLY (OUTPATIENT)
Dept: FAMILY MEDICINE CLINIC | Facility: CLINIC | Age: 56
End: 2021-10-26

## 2021-10-26 DIAGNOSIS — Z23 NEED FOR VACCINATION: Primary | ICD-10-CM

## 2021-10-26 PROCEDURE — 90471 IMMUNIZATION ADMIN: CPT | Performed by: FAMILY MEDICINE

## 2021-10-26 PROCEDURE — 90750 HZV VACC RECOMBINANT IM: CPT | Performed by: FAMILY MEDICINE

## 2021-10-27 ENCOUNTER — ULTRASOUND ENCOUNTER (OUTPATIENT)
Dept: OBGYN CLINIC | Facility: CLINIC | Age: 56
End: 2021-10-27

## 2021-10-27 DIAGNOSIS — N95.0 PMB (POSTMENOPAUSAL BLEEDING): ICD-10-CM

## 2021-10-27 PROCEDURE — 76856 US EXAM PELVIC COMPLETE: CPT | Performed by: OBSTETRICS & GYNECOLOGY

## 2021-10-27 PROCEDURE — 76830 TRANSVAGINAL US NON-OB: CPT | Performed by: OBSTETRICS & GYNECOLOGY

## 2021-10-28 NOTE — TELEPHONE ENCOUNTER
Patient brought in [address] of her thyroid. I am sending them to the [employer] for comparison.  Hopefully this is what they need as it is not a disc

## 2021-11-04 ENCOUNTER — TELEPHONE (OUTPATIENT)
Dept: FAMILY MEDICINE CLINIC | Facility: CLINIC | Age: 56
End: 2021-11-04

## 2021-11-04 NOTE — TELEPHONE ENCOUNTER
Luigi called from [facility] - stating they received records by  from our office of thyroid USN images. He states they were sent on paper and not on CD.  Also stated that it did not come with a report at all, and there are a lot of screwed up things that he

## 2021-11-04 NOTE — TELEPHONE ENCOUNTER
This RN reached out to [facility] and per [employer] were done in office and PSR will send msg to Dr. José Miguel Prasad office to find out how [address] [place] [address] can obtain US results on a disk as well as report. This RN informed pt as well as ALS.  Rush to call back office with up Features: hypoechoic, with hilar line present and no   significant vascularity    There is not cystic necrosis.    There are not calcifications.      There is NO adenopathy at the site of palpable abnormality of L   upper anterior cervical region at/just an

## 2021-11-08 ENCOUNTER — IMMUNIZATION (OUTPATIENT)
Dept: FAMILY MEDICINE CLINIC | Facility: CLINIC | Age: 56
End: 2021-11-08

## 2021-11-08 DIAGNOSIS — Z23 NEED FOR VACCINATION: Primary | ICD-10-CM

## 2021-11-08 PROCEDURE — 90471 IMMUNIZATION ADMIN: CPT | Performed by: FAMILY MEDICINE

## 2021-11-08 PROCEDURE — 90686 IIV4 VACC NO PRSV 0.5 ML IM: CPT | Performed by: FAMILY MEDICINE

## 2021-11-08 NOTE — TELEPHONE ENCOUNTER
Nik Yoo came in today for a FLU shot and she states she is just going to schedule the US at [facility] she has went back and forth with [facility] and now they are saying the office of Dr. Jessica Quezada does not even have the equipment to put images on a disc and th

## 2021-11-09 DIAGNOSIS — E04.1 THYROID NODULE: Primary | ICD-10-CM

## 2021-11-09 NOTE — TELEPHONE ENCOUNTER
Informed Teodora Cali of below. Teodora Cali verbalized understanding and agrees with POC.     Per Dolly Mcclain, Estefany Medina came in today for a FLU shot and she states she is just going to schedule the US at [facility] she has went back and forth with Aleksandr Dupree and now they ar

## 2021-11-17 ENCOUNTER — HOSPITAL ENCOUNTER (OUTPATIENT)
Dept: ULTRASOUND IMAGING | Age: 56
Discharge: HOME | End: 2021-11-17
Attending: FAMILY MEDICINE
Payer: COMMERCIAL

## 2021-11-17 DIAGNOSIS — E04.2 MULTIPLE THYROID NODULES: ICD-10-CM

## 2021-11-22 ENCOUNTER — LAB ENCOUNTER (OUTPATIENT)
Dept: LAB | Facility: REFERENCE LAB | Age: 56
End: 2021-11-22
Attending: FAMILY MEDICINE
Payer: COMMERCIAL

## 2021-11-22 ENCOUNTER — TELEPHONE (OUTPATIENT)
Dept: FAMILY MEDICINE CLINIC | Facility: CLINIC | Age: 56
End: 2021-11-22

## 2021-11-22 DIAGNOSIS — E87.6 HYPOKALEMIA: ICD-10-CM

## 2021-11-22 PROCEDURE — 80048 BASIC METABOLIC PNL TOTAL CA: CPT

## 2021-11-22 PROCEDURE — 36415 COLL VENOUS BLD VENIPUNCTURE: CPT

## 2021-11-22 NOTE — TELEPHONE ENCOUNTER
Letter placed and at the . Called pt and informed vaccine letter at . Per pt needs Lot # expiration date of current Flu vaccine and done, verbalized understanding.

## 2021-11-22 NOTE — TELEPHONE ENCOUNTER
Pt looking to  copy of immunizations, specially flu vaccination. Pt will be coming in for BMP blood draw today [facility] and would like to  information at  today if possible.

## 2021-12-03 ENCOUNTER — LAB ENCOUNTER (OUTPATIENT)
Dept: LAB | Facility: HOSPITAL | Age: 56
End: 2021-12-03
Attending: FAMILY MEDICINE
Payer: COMMERCIAL

## 2021-12-03 DIAGNOSIS — E04.1 THYROID NODULE: ICD-10-CM

## 2021-12-06 ENCOUNTER — HOSPITAL ENCOUNTER (OUTPATIENT)
Dept: ULTRASOUND IMAGING | Facility: HOSPITAL | Age: 56
Discharge: HOME | End: 2021-12-06
Attending: FAMILY MEDICINE
Payer: COMMERCIAL

## 2021-12-06 VITALS — DIASTOLIC BLOOD PRESSURE: 67 MMHG | SYSTOLIC BLOOD PRESSURE: 143 MMHG | HEART RATE: 75 BPM

## 2021-12-06 DIAGNOSIS — E04.1 THYROID NODULE: ICD-10-CM

## 2021-12-06 PROCEDURE — 88173 CYTOPATH EVAL FNA REPORT: CPT | Performed by: FAMILY MEDICINE

## 2021-12-06 PROCEDURE — 88177 CYTP FNA EVAL EA ADDL: CPT | Performed by: FAMILY MEDICINE

## 2021-12-06 PROCEDURE — 10005 FNA BX W/US GDN 1ST LES: CPT | Performed by: FAMILY MEDICINE

## 2021-12-06 PROCEDURE — 88172 CYTP DX EVAL FNA 1ST EA SITE: CPT | Performed by: FAMILY MEDICINE

## 2021-12-09 ENCOUNTER — MED REC SCAN ONLY (OUTPATIENT)
Dept: OBGYN CLINIC | Facility: CLINIC | Age: 56
End: 2021-12-09

## 2021-12-09 ENCOUNTER — TELEPHONE (OUTPATIENT)
Dept: OBGYN CLINIC | Facility: CLINIC | Age: 56
End: 2021-12-09

## 2021-12-09 NOTE — TELEPHONE ENCOUNTER
Called pt and provided mammogram results to repeat in 1 yr per JN. Verbalized understanding. Per pt year for  is  instead of . Called registration and informed, will call her to correct.

## 2021-12-10 DIAGNOSIS — E04.2 MULTINODULAR GOITER: Primary | ICD-10-CM

## 2022-03-07 ENCOUNTER — TELEPHONE (OUTPATIENT)
Dept: OBGYN CLINIC | Facility: CLINIC | Age: 57
End: 2022-03-07

## 2022-03-07 RX ORDER — POTASSIUM CHLORIDE 1500 MG/1
20 TABLET, FILM COATED, EXTENDED RELEASE ORAL DAILY
Qty: 90 TABLET | Refills: 1 | Status: SHIPPED | OUTPATIENT
Start: 2022-03-07 | End: 2022-03-11

## 2022-03-07 RX ORDER — OMEPRAZOLE 40 MG/1
40 CAPSULE, DELAYED RELEASE ORAL DAILY
Qty: 90 CAPSULE | Refills: 1 | Status: SHIPPED | OUTPATIENT
Start: 2022-03-07

## 2022-03-07 RX ORDER — HYDROCHLOROTHIAZIDE 25 MG/1
25 TABLET ORAL DAILY
Qty: 90 TABLET | Refills: 1 | Status: SHIPPED | OUTPATIENT
Start: 2022-03-07 | End: 2022-03-11

## 2022-03-07 NOTE — TELEPHONE ENCOUNTER
[facility]-GASTROENTEROLOGY [address] [place] [address] [phone]     Called pt and provided Dr. Gloria Chun message, including phone number. Pt agrees.

## 2022-03-07 NOTE — TELEPHONE ENCOUNTER
Referral for [place] providers placed but would need to call first for phone screen to schedule colonoscopy.

## 2022-03-10 ENCOUNTER — LAB ENCOUNTER (OUTPATIENT)
Dept: LAB | Facility: REFERENCE LAB | Age: 57
End: 2022-03-10
Attending: FAMILY MEDICINE
Payer: COMMERCIAL

## 2022-03-10 ENCOUNTER — OFFICE VISIT (OUTPATIENT)
Dept: FAMILY MEDICINE CLINIC | Facility: CLINIC | Age: 57
End: 2022-03-10
Payer: COMMERCIAL

## 2022-03-10 VITALS
DIASTOLIC BLOOD PRESSURE: 62 MMHG | HEIGHT: 65 IN | OXYGEN SATURATION: 98 % | WEIGHT: 219 LBS | HEART RATE: 72 BPM | SYSTOLIC BLOOD PRESSURE: 118 MMHG | BODY MASS INDEX: 36.49 KG/M2

## 2022-03-10 DIAGNOSIS — L65.9 HAIR LOSS: ICD-10-CM

## 2022-03-10 DIAGNOSIS — E87.6 HYPOKALEMIA: ICD-10-CM

## 2022-03-10 DIAGNOSIS — I10 ESSENTIAL HYPERTENSION: Primary | ICD-10-CM

## 2022-03-10 LAB
ANION GAP SERPL CALC-SCNC: 7 MMOL/L (ref 0–18)
BASOPHILS # BLD AUTO: 0.04 X10(3) UL (ref 0–0.2)
BASOPHILS NFR BLD AUTO: 0.4 %
BUN BLD-MCNC: 11 MG/DL (ref 7–18)
BUN/CREAT SERPL: 11.8 (ref 10–20)
CALCIUM BLD-MCNC: 10.1 MG/DL (ref 8.5–10.1)
CHLORIDE SERPL-SCNC: 105 MMOL/L (ref 98–112)
CO2 SERPL-SCNC: 28 MMOL/L (ref 21–32)
CREAT BLD-MCNC: 0.93 MG/DL
DEPRECATED RDW RBC AUTO: 48.4 FL (ref 35.1–46.3)
EOSINOPHIL # BLD AUTO: 0.06 X10(3) UL (ref 0–0.7)
EOSINOPHIL NFR BLD AUTO: 0.6 %
ERYTHROCYTE [DISTWIDTH] IN BLOOD BY AUTOMATED COUNT: 14.1 % (ref 11–15)
FASTING STATUS PATIENT QL REPORTED: YES
GLUCOSE BLD-MCNC: 86 MG/DL (ref 70–99)
HCT VFR BLD AUTO: 43 %
HGB BLD-MCNC: 13.6 G/DL
IMM GRANULOCYTES # BLD AUTO: 0.03 X10(3) UL (ref 0–1)
IMM GRANULOCYTES NFR BLD: 0.3 %
LYMPHOCYTES # BLD AUTO: 4.26 X10(3) UL (ref 1–4)
LYMPHOCYTES NFR BLD AUTO: 46.1 %
MCH RBC QN AUTO: 29.6 PG (ref 26–34)
MCHC RBC AUTO-ENTMCNC: 31.6 G/DL (ref 31–37)
MCV RBC AUTO: 93.7 FL
MONOCYTES # BLD AUTO: 0.71 X10(3) UL (ref 0.1–1)
MONOCYTES NFR BLD AUTO: 7.7 %
NEUTROPHILS # BLD AUTO: 4.14 X10(3) UL (ref 1.5–7.7)
NEUTROPHILS NFR BLD AUTO: 44.9 %
OSMOLALITY SERPL CALC.SUM OF ELEC: 289 MOSM/KG (ref 275–295)
PLATELET # BLD AUTO: 327 10(3)UL (ref 150–450)
POTASSIUM SERPL-SCNC: 3.8 MMOL/L (ref 3.5–5.1)
RBC # BLD AUTO: 4.59 X10(6)UL
SODIUM SERPL-SCNC: 140 MMOL/L (ref 136–145)
TSI SER-ACNC: 0.57 MIU/ML (ref 0.36–3.74)
WBC # BLD AUTO: 9.2 X10(3) UL (ref 4–11)

## 2022-03-10 PROCEDURE — 84443 ASSAY THYROID STIM HORMONE: CPT | Performed by: FAMILY MEDICINE

## 2022-03-10 PROCEDURE — 80048 BASIC METABOLIC PNL TOTAL CA: CPT | Performed by: FAMILY MEDICINE

## 2022-03-10 PROCEDURE — 99214 OFFICE O/P EST MOD 30 MIN: CPT | Performed by: FAMILY MEDICINE

## 2022-03-10 PROCEDURE — 3074F SYST BP LT 130 MM HG: CPT | Performed by: FAMILY MEDICINE

## 2022-03-10 PROCEDURE — 3078F DIAST BP <80 MM HG: CPT | Performed by: FAMILY MEDICINE

## 2022-03-10 PROCEDURE — 85025 COMPLETE CBC W/AUTO DIFF WBC: CPT | Performed by: FAMILY MEDICINE

## 2022-03-10 PROCEDURE — 3008F BODY MASS INDEX DOCD: CPT | Performed by: FAMILY MEDICINE

## 2022-03-10 PROCEDURE — 36415 COLL VENOUS BLD VENIPUNCTURE: CPT | Performed by: FAMILY MEDICINE

## 2022-03-11 RX ORDER — AMLODIPINE BESYLATE 5 MG/1
5 TABLET ORAL DAILY
Qty: 90 TABLET | Refills: 0 | Status: SHIPPED | OUTPATIENT
Start: 2022-03-11

## 2022-04-19 ENCOUNTER — PATIENT MESSAGE (OUTPATIENT)
Dept: FAMILY MEDICINE CLINIC | Facility: CLINIC | Age: 57
End: 2022-04-19

## 2022-04-20 NOTE — TELEPHONE ENCOUNTER
From: Marcello Coto  To:  Lovely Zapata DO  Sent: 4/19/2022 8:44 PM CDT  Subject: Blood Pressure Readings    Good Evening Dr. Alexandra Browne, forwarding you my blood pressure readings prior to my scheduled appointment on Thursday, April 21st.    March 23 (2 tablets 10mg)  9:30am 173/89  8:30pm 122/76    March 24  8am 127/76  10am 122/64  12:30pm 121/74  1:45pm 141/75  3:30pm 126/76    March 25  6:30am 148/76  9:15pm 158/77    March 26  10am 167/81  8pm 141/82    March 27  8am 139/77  8:30pm 122/76    March 28  7am 161/77  8:30pm 157/81    March 29  6:30am 166/81  8:30pm 152/76    March 30  6:15 am 145/82  10pm 151/80    March 31  8am 157/83    April 1  6:30 157/81  10pm 144/74    April 2  7am 120/80  5pm 128/64    April 3  8am 161/83  10:30pm 140/75    April 4  7am 180/76  4:30pm 154/76    April 5  6:30am 147/76  7:30pm 127/64    April 6  6:30am 158/81    April 7  7am 145/75  7pm 154/74    April 8  7am 161/80  9pm 143/68    April 9  6:30am 144/86  10pm 158/78    April 10  8am 127/65  9pm 158/78    April 11  7am 170/82  9pm 116/86    April 12  6:30 148/73  10pm 168/79    April 13  6:30am 155/76  11pm 148/66    April 14  8am 162/78  9pm 119/64    April 15  7:30 151/73  9:30pm 155/73    April 16  6:30am 140/74  5:30pm 109/56    April 17  8am 155/77  9pm 141/64    April 18  6:30 163/86  9pm 151/75    April 19  7am 149/89  7pm 149/70     Elfego Osborn

## 2022-04-21 ENCOUNTER — OFFICE VISIT (OUTPATIENT)
Dept: FAMILY MEDICINE CLINIC | Facility: CLINIC | Age: 57
End: 2022-04-21
Payer: COMMERCIAL

## 2022-04-21 VITALS
BODY MASS INDEX: 37.49 KG/M2 | DIASTOLIC BLOOD PRESSURE: 80 MMHG | OXYGEN SATURATION: 98 % | HEART RATE: 88 BPM | WEIGHT: 225 LBS | HEIGHT: 65 IN | SYSTOLIC BLOOD PRESSURE: 148 MMHG

## 2022-04-21 DIAGNOSIS — M25.511 ACUTE PAIN OF RIGHT SHOULDER: ICD-10-CM

## 2022-04-21 DIAGNOSIS — E87.6 HYPOKALEMIA: ICD-10-CM

## 2022-04-21 DIAGNOSIS — I10 UNCONTROLLED HYPERTENSION: Primary | ICD-10-CM

## 2022-04-21 PROCEDURE — 99214 OFFICE O/P EST MOD 30 MIN: CPT | Performed by: FAMILY MEDICINE

## 2022-04-21 PROCEDURE — 3077F SYST BP >= 140 MM HG: CPT | Performed by: FAMILY MEDICINE

## 2022-04-21 PROCEDURE — 3079F DIAST BP 80-89 MM HG: CPT | Performed by: FAMILY MEDICINE

## 2022-04-21 PROCEDURE — 3008F BODY MASS INDEX DOCD: CPT | Performed by: FAMILY MEDICINE

## 2022-04-21 RX ORDER — VALSARTAN 160 MG/1
160 TABLET ORAL DAILY
Qty: 90 TABLET | Refills: 0 | Status: SHIPPED | OUTPATIENT
Start: 2022-04-21

## 2022-04-22 RX ORDER — AMLODIPINE BESYLATE 10 MG/1
TABLET ORAL
Qty: 90 TABLET | Refills: 1 | Status: SHIPPED | OUTPATIENT
Start: 2022-04-22

## 2022-04-28 ENCOUNTER — LAB ENCOUNTER (OUTPATIENT)
Dept: LAB | Facility: REFERENCE LAB | Age: 57
End: 2022-04-28
Attending: FAMILY MEDICINE
Payer: COMMERCIAL

## 2022-04-28 DIAGNOSIS — I10 PRIMARY HYPERTENSION: ICD-10-CM

## 2022-04-28 DIAGNOSIS — L65.9 HAIR LOSS: ICD-10-CM

## 2022-04-28 LAB — VIT D+METAB SERPL-MCNC: 53.6 NG/ML (ref 30–100)

## 2022-04-28 PROCEDURE — 82306 VITAMIN D 25 HYDROXY: CPT

## 2022-04-28 PROCEDURE — 36415 COLL VENOUS BLD VENIPUNCTURE: CPT

## 2022-07-06 ENCOUNTER — MED REC SCAN ONLY (OUTPATIENT)
Dept: FAMILY MEDICINE CLINIC | Facility: CLINIC | Age: 57
End: 2022-07-06

## 2022-07-07 RX ORDER — VALSARTAN 160 MG/1
160 TABLET ORAL DAILY
Qty: 90 TABLET | Refills: 0 | Status: SHIPPED | OUTPATIENT
Start: 2022-07-07 | End: 2022-07-12

## 2022-07-12 ENCOUNTER — OFFICE VISIT (OUTPATIENT)
Dept: FAMILY MEDICINE CLINIC | Facility: CLINIC | Age: 57
End: 2022-07-12
Payer: COMMERCIAL

## 2022-07-12 VITALS
WEIGHT: 224 LBS | HEIGHT: 65 IN | DIASTOLIC BLOOD PRESSURE: 82 MMHG | BODY MASS INDEX: 37.32 KG/M2 | HEART RATE: 74 BPM | SYSTOLIC BLOOD PRESSURE: 126 MMHG | OXYGEN SATURATION: 98 %

## 2022-07-12 DIAGNOSIS — R60.9 PERIPHERAL EDEMA: ICD-10-CM

## 2022-07-12 DIAGNOSIS — R82.998 DARK URINE: ICD-10-CM

## 2022-07-12 DIAGNOSIS — I10 ESSENTIAL HYPERTENSION: Primary | ICD-10-CM

## 2022-07-12 LAB
APPEARANCE: CLEAR
BILIRUBIN: NEGATIVE
GLUCOSE (URINE DIPSTICK): NEGATIVE MG/DL
LEUKOCYTES: NEGATIVE
MULTISTIX LOT#: NORMAL NUMERIC
NITRITE, URINE: NEGATIVE
OCCULT BLOOD: NEGATIVE
PH, URINE: 6 (ref 4.5–8)
PROTEIN (URINE DIPSTICK): NEGATIVE MG/DL
SPECIFIC GRAVITY: 1.02 (ref 1–1.03)
URINE-COLOR: YELLOW
UROBILINOGEN,SEMI-QN: 0.2 MG/DL (ref 0–1.9)

## 2022-07-12 RX ORDER — VALSARTAN 320 MG/1
320 TABLET ORAL DAILY
Qty: 30 TABLET | Refills: 0 | Status: SHIPPED | OUTPATIENT
Start: 2022-07-12

## 2022-07-12 RX ORDER — AMLODIPINE BESYLATE 5 MG/1
5 TABLET ORAL DAILY
Qty: 30 TABLET | Refills: 0 | Status: SHIPPED | OUTPATIENT
Start: 2022-07-12

## 2022-08-03 ENCOUNTER — MED REC SCAN ONLY (OUTPATIENT)
Dept: OBGYN CLINIC | Facility: CLINIC | Age: 57
End: 2022-08-03

## 2022-08-16 RX ORDER — AMLODIPINE BESYLATE 5 MG/1
TABLET ORAL
Qty: 30 TABLET | Refills: 0 | Status: SHIPPED | OUTPATIENT
Start: 2022-08-16

## 2022-08-16 RX ORDER — VALSARTAN 320 MG/1
TABLET ORAL
Qty: 30 TABLET | Refills: 0 | Status: SHIPPED | OUTPATIENT
Start: 2022-08-16

## 2022-10-03 RX ORDER — AMLODIPINE BESYLATE 5 MG/1
5 TABLET ORAL DAILY
Qty: 90 TABLET | Refills: 1 | OUTPATIENT
Start: 2022-10-03

## 2022-10-03 RX ORDER — VALSARTAN 320 MG/1
320 TABLET ORAL DAILY
Qty: 90 TABLET | Refills: 1 | Status: SHIPPED | OUTPATIENT
Start: 2022-10-03

## 2022-10-03 RX ORDER — AMLODIPINE BESYLATE 5 MG/1
5 TABLET ORAL DAILY
Qty: 90 TABLET | Refills: 1 | Status: SHIPPED | OUTPATIENT
Start: 2022-10-03

## 2022-10-03 RX ORDER — VALSARTAN 320 MG/1
320 TABLET ORAL DAILY
Qty: 90 TABLET | Refills: 1 | OUTPATIENT
Start: 2022-10-03

## 2022-10-03 NOTE — TELEPHONE ENCOUNTER
Please review. Protocol failed/ No protocol      Requested Prescriptions   Pending Prescriptions Disp Refills    VALSARTAN 320 MG Oral Tab [Pharmacy Med Name: VALSARTAN 320MG TABLETS] 30 tablet 0     Sig: TAKE 1 TABLET(320 MG) BY MOUTH DAILY        Hypertensive Medications Protocol Failed - 9/30/2022 10:46 PM        Failed - CMP or BMP in past 6 months     No results found for this or any previous visit (from the past 4392 hour(s)).               Passed - In person appointment in the past 12 or next 3 months       Recent Outpatient Visits              2 months ago Essential hypertension    [address] [place] [address] MARCELLA Desir,     Office Visit    5 months ago Uncontrolled hypertension    [address] [place] [address] [address] Marvin Nunez,     Office Visit    6 months ago Essential hypertension    [address] [place], [place] [address]orahMARCELLA,     Office Visit    11 months ago     [address] [place] [address]    Nurse Only    1 year ago PMB (postmenopausal bleeding)    [address] [place] [address] [address] Dori Boyer MD    Office Visit     Future Appointments         Provider Department Appt Notes    In 2 weeks Warden Carreno, [address] [place] [address] Blood Pressure    In 1 month Dori Boyer MD [address] [place] [address] 183 pH imbalance               Passed - Last BP reading less than 140/90     BP Readings from Last 1 Encounters:  07/12/22 : 126/82                Passed - In person appointment or virtual visit in the past 6 months       Recent Outpatient Visits              2 months ago Essential hypertension    [address] [facility] MARCELLA Desir, [place]    Office Visit    5 months ago Uncontrolled hypertension    [address] [facility] [address] Marvin Nunez,     Office Visit    6 months ago Essential hypertension    [address] [place], [place]    Office Visit    11 months ago     [address]    Nurse Only    1 year ago PMB (postmenopausal bleeding)    [facility] Heaven Gilliam MD    Office Visit     Future Appointments         Provider Department Appt Notes    In 2 weeks Gibran Rosenthal [address] [place] [address] Blood Pressure    In 1 month Heaven Gilliam MD [address] [place] [address] pH imbalance               Passed - EGFRCR or GFRAA > 50     GFR Evaluation  GFRAA: 78 , resulted on 3/10/2022               AMLODIPINE 5 MG Oral Tab [Pharmacy Med Name: AMLODIPINE BESYLATE 5MG TABLETS] 30 tablet 0     Sig: TAKE 1 TABLET(5 MG) BY MOUTH DAILY        Hypertensive Medications Protocol Failed - 9/30/2022 10:46 PM        Failed - CMP or BMP in past 6 months     No results found for this or any previous visit (from the past 4392 hour(s)).               Passed - In person appointment in the past 12 or next 3 months       Recent Outpatient Visits              2 months ago Essential hypertension    [address] [place] [address], [facility], DO    Office Visit    5 months ago Uncontrolled hypertension    [facility], DO    Office Visit    6 months ago Essential hypertension    [address] [place] [address], [facility], DO    Office Visit    11 months ago     [address] [place] [address]    Nurse Only    1 year ago PMB (postmenopausal bleeding)    [address] [place], [place] [address]Mia MD    Office Visit     Future Appointments         Provider Department Appt Notes    In 2 weeks Valdemar Hogan. [address] [facility] Blood Pressure    In 1 month Jose Hutchinson MD [address] pH imbalance               Passed - Last BP reading less than 140/90     BP Readings from Last 1 Encounters:  07/12/22 : 126/82                Passed - In person appointment or virtual visit in the past 6 months       Recent Outpatient Visits              2 months ago Essential hypertension    [address] [place] [address], [place], [place]    Office Visit    5 months ago Uncontrolled hypertension    [address] [place] [address], [facility],     Office Visit    6 months ago Essential hypertension    [address] , [address], [facility]    Office Visit    11 months ago     [address] , [place] [address], [facility]    Nurse Only    1 year ago PMB (postmenopausal bleeding)    [address] [place] [address] [facility] Jose Hutchinson MD    Office Visit     Future Appointments         Provider Department Appt Notes    In 2 weeks Brynn Akbar [address] [place] [address] [address] Blood Pressure    In 1 month Jose Hutchinson MD [address] [place] [address] [address] pH imbalance               Passed - EGFRCR or GFRAA > 50     GFR Evaluation  GFRAA: 78 , resulted on 3/10/2022                  Future Appointments         Provider Department Appt Notes    In 2 weeks Brynn Akbar [address] [place] [address] [address] Blood Pressure    In 1 month Jose Hutchinson MD [address] [place] [address] [address] pH imbalance             Recent Outpatient Visits              2 months ago Essential hypertension    [address] , [address], [facility] KarleeMARCELLA, DO    Office Visit    5 months ago Uncontrolled hypertension    [address] [place] [address] [facility] Roberto Jensen, DO    Office Visit    6 months ago Essential hypertension    [address] [place], [place]    Office Visit    11 months ago     [employer] [address], [place]    Nurse Only    1 year ago PMB (postmenopausal bleeding)    Tomer Alvarez MD    Office Visit

## 2022-10-03 NOTE — TELEPHONE ENCOUNTER
Please review refill protocol failed/ no protocol  Requested Prescriptions   Pending Prescriptions Disp Refills    amLODIPine 5 MG Oral Tab 30 tablet 0     Sig: Take 1 tablet (5 mg total) by mouth daily. Hypertensive Medications Protocol Failed - 10/1/2022  4:55 PM        Failed - CMP or BMP in past 6 months     No results found for this or any previous visit (from the past 4392 hour(s)).               Passed - In person appointment in the past 12 or next 3 months       Recent Outpatient Visits              2 months ago Essential hypertension    Donato Linda Daria Ku, DO    Office Visit    5 months ago Uncontrolled hypertension    [address] Vonzell Cola, DO    Office Visit    6 months ago Essential hypertension    Donato Linda Daria Ku, DO    Office Visit    11 months ago     [address]    Nurse Only    1 year ago PMB (postmenopausal bleeding)    [address] Megan Hancock MD    Office Visit     Future Appointments         Provider Department Appt Notes    In 2 weeks Venkat [address]inas [address] Blood Pressure    In 1 month MD Venkat Aguirre [address] pH imbalance               Passed - Last BP reading less than 140/90     BP Readings from Last 1 Encounters:  07/12/22 : 126/82                Passed - In person appointment or virtual visit in the past 6 months       Recent Outpatient Visits              2 months ago Essential hypertension    [address], [address], [place], [place]    Office Visit    5 months ago Uncontrolled hypertension    Donato Linda Daria Ku, DO    Office Visit    6 months ago Essential hypertension    [address] [place] [address] 183 [place], [place]    Office Visit    11 months ago     [address], [facility] 183    Nurse Only    1 year ago PMB (postmenopausal bleeding)    [address] [place] [address] 183 Aleksey Reyes MD    Office Visit     Future Appointments         Provider Department Appt Notes    In 2 weeks Majojeanne Zhao, [address] [place] [address] 183 Blood Pressure    In 1 month Aleksey Reyes MD [address] pH imbalance               Passed - EGFRCR or GFRAA > 50     GFR Evaluation  GFRAA: 78 , resulted on 3/10/2022               valsartan 320 MG Oral Tab 30 tablet 0     Sig: Take 1 tablet (320 mg total) by mouth daily. Hypertensive Medications Protocol Failed - 10/1/2022  4:55 PM        Failed - CMP or BMP in past 6 months     No results found for this or any previous visit (from the past 4392 hour(s)).               Passed - In person appointment in the past 12 or next 3 months       Recent Outpatient Visits              2 months ago Essential hypertension    Claudine Frost, [place]o [place], DO    Office Visit    5 months ago Uncontrolled hypertension    [address] [place] [address], [facility] Glo Desir, DO    Office Visit    6 months ago Essential hypertension    Claudine Frost, Glo [place], DO    Office Visit    11 months ago     Claudine Frost, [facility] 183    Nurse Only    1 year ago PMB (postmenopausal bleeding)    Claudine Frost, Glendy Scott MD    Office Visit     Future Appointments         Provider Department Appt Notes    In 2 weeks Majo Zhao, [address] [place] [address] 183 Blood Pressure    In 1 month Aleksey Reyes MD [facility] [address] [facility] pH imbalance               Passed - Last BP reading less than 140/90     BP Readings from Last 1 Encounters:  07/12/22 : 126/82                Passed - In person appointment or virtual visit in the past 6 months       Recent Outpatient Visits              2 months ago Essential hypertension    [address] [place] [address], [facility] [place]    Office Visit    5 months ago Uncontrolled hypertension    [address] [place] [address] Leninkevon Ashby,     Office Visit    6 months ago Essential hypertension    [address] [place], [place] [address]Lenin, DO    Office Visit    11 months ago     [address] [place] [address] 183    Nurse Only    1 year ago PMB (postmenopausal bleeding)    [address] [place] [address] 183 Joanna Cheema MD    Office Visit     Future Appointments         Provider Department Appt Notes    In 2 weeks Radha Cash, [address] [place] [address] 183 Blood Pressure    In 1 month Joanna Cheema MD [address] pH imbalance               Passed - EGFRCR or GFRAA > 50     GFR Evaluation  GFRAA: 78 , resulted on 3/10/2022

## 2022-10-20 ENCOUNTER — OFFICE VISIT (OUTPATIENT)
Dept: FAMILY MEDICINE CLINIC | Facility: CLINIC | Age: 57
End: 2022-10-20
Payer: COMMERCIAL

## 2022-10-20 VITALS
WEIGHT: 228 LBS | HEART RATE: 79 BPM | BODY MASS INDEX: 37.99 KG/M2 | OXYGEN SATURATION: 97 % | DIASTOLIC BLOOD PRESSURE: 74 MMHG | HEIGHT: 65 IN | SYSTOLIC BLOOD PRESSURE: 122 MMHG

## 2022-10-20 DIAGNOSIS — Z12.11 COLON CANCER SCREENING: ICD-10-CM

## 2022-10-20 DIAGNOSIS — Z12.31 SCREENING MAMMOGRAM, ENCOUNTER FOR: ICD-10-CM

## 2022-10-20 DIAGNOSIS — Z00.00 ENCOUNTER FOR ROUTINE ADULT HEALTH EXAMINATION WITHOUT ABNORMAL FINDINGS: Primary | ICD-10-CM

## 2022-10-20 DIAGNOSIS — Z23 NEED FOR VACCINATION: ICD-10-CM

## 2022-10-20 DIAGNOSIS — E04.2 MULTINODULAR GOITER: ICD-10-CM

## 2022-10-20 DIAGNOSIS — I10 ESSENTIAL HYPERTENSION: ICD-10-CM

## 2022-10-20 PROBLEM — E87.6 HYPOKALEMIA: Status: RESOLVED | Noted: 2021-08-30 | Resolved: 2022-10-20

## 2022-10-20 PROCEDURE — 3078F DIAST BP <80 MM HG: CPT | Performed by: FAMILY MEDICINE

## 2022-10-20 PROCEDURE — 90472 IMMUNIZATION ADMIN EACH ADD: CPT | Performed by: FAMILY MEDICINE

## 2022-10-20 PROCEDURE — 99396 PREV VISIT EST AGE 40-64: CPT | Performed by: FAMILY MEDICINE

## 2022-10-20 PROCEDURE — 90677 PCV20 VACCINE IM: CPT | Performed by: FAMILY MEDICINE

## 2022-10-20 PROCEDURE — 90686 IIV4 VACC NO PRSV 0.5 ML IM: CPT | Performed by: FAMILY MEDICINE

## 2022-10-20 PROCEDURE — 3008F BODY MASS INDEX DOCD: CPT | Performed by: FAMILY MEDICINE

## 2022-10-20 PROCEDURE — 90471 IMMUNIZATION ADMIN: CPT | Performed by: FAMILY MEDICINE

## 2022-10-20 PROCEDURE — 3074F SYST BP LT 130 MM HG: CPT | Performed by: FAMILY MEDICINE

## 2022-10-20 RX ORDER — AMLODIPINE BESYLATE 5 MG/1
5 TABLET ORAL NIGHTLY
Qty: 90 TABLET | Refills: 1 | COMMUNITY
Start: 2022-10-20 | End: 2022-10-20

## 2022-10-20 RX ORDER — AMLODIPINE BESYLATE 5 MG/1
5 TABLET ORAL NIGHTLY
Qty: 90 TABLET | Refills: 0 | Status: SHIPPED | OUTPATIENT
Start: 2022-10-20

## 2022-10-20 RX ORDER — VALSARTAN 320 MG/1
320 TABLET ORAL DAILY
Qty: 90 TABLET | Refills: 0 | Status: SHIPPED | OUTPATIENT
Start: 2022-10-20

## 2022-10-21 ENCOUNTER — LAB ENCOUNTER (OUTPATIENT)
Dept: LAB | Facility: REFERENCE LAB | Age: 57
End: 2022-10-21
Attending: FAMILY MEDICINE
Payer: COMMERCIAL

## 2022-10-21 DIAGNOSIS — Z00.00 ENCOUNTER FOR ROUTINE ADULT HEALTH EXAMINATION WITHOUT ABNORMAL FINDINGS: ICD-10-CM

## 2022-10-21 DIAGNOSIS — Z12.11 COLON CANCER SCREENING: ICD-10-CM

## 2022-10-21 LAB
ALBUMIN SERPL-MCNC: 3.6 G/DL (ref 3.4–5)
ALBUMIN/GLOB SERPL: 0.9 {RATIO} (ref 1–2)
ALP LIVER SERPL-CCNC: 111 U/L
ALT SERPL-CCNC: 33 U/L
ANION GAP SERPL CALC-SCNC: 5 MMOL/L (ref 0–18)
AST SERPL-CCNC: 17 U/L (ref 15–37)
BILIRUB SERPL-MCNC: 0.4 MG/DL (ref 0.1–2)
BUN BLD-MCNC: 9 MG/DL (ref 7–18)
BUN/CREAT SERPL: 11.7 (ref 10–20)
CALCIUM BLD-MCNC: 9 MG/DL (ref 8.5–10.1)
CHLORIDE SERPL-SCNC: 108 MMOL/L (ref 98–112)
CHOLEST SERPL-MCNC: 167 MG/DL (ref ?–200)
CO2 SERPL-SCNC: 26 MMOL/L (ref 21–32)
CREAT BLD-MCNC: 0.77 MG/DL
EST. AVERAGE GLUCOSE BLD GHB EST-MCNC: 111 MG/DL (ref 68–126)
FASTING PATIENT LIPID ANSWER: YES
FASTING STATUS PATIENT QL REPORTED: YES
GFR SERPLBLD BASED ON 1.73 SQ M-ARVRAT: 89 ML/MIN/1.73M2 (ref 60–?)
GLOBULIN PLAS-MCNC: 4.2 G/DL (ref 2.8–4.4)
GLUCOSE BLD-MCNC: 89 MG/DL (ref 70–99)
HBA1C MFR BLD: 5.5 % (ref ?–5.7)
HDLC SERPL-MCNC: 52 MG/DL (ref 40–59)
HEMOCCULT STL QL: POSITIVE
LDLC SERPL CALC-MCNC: 101 MG/DL (ref ?–100)
NONHDLC SERPL-MCNC: 115 MG/DL (ref ?–130)
OSMOLALITY SERPL CALC.SUM OF ELEC: 286 MOSM/KG (ref 275–295)
POTASSIUM SERPL-SCNC: 3.9 MMOL/L (ref 3.5–5.1)
PROT SERPL-MCNC: 7.8 G/DL (ref 6.4–8.2)
SODIUM SERPL-SCNC: 139 MMOL/L (ref 136–145)
TRIGL SERPL-MCNC: 76 MG/DL (ref 30–149)
VLDLC SERPL CALC-MCNC: 13 MG/DL (ref 0–30)

## 2022-10-21 PROCEDURE — 80061 LIPID PANEL: CPT

## 2022-10-21 PROCEDURE — 82274 ASSAY TEST FOR BLOOD FECAL: CPT

## 2022-10-21 PROCEDURE — 36415 COLL VENOUS BLD VENIPUNCTURE: CPT

## 2022-10-21 PROCEDURE — 80053 COMPREHEN METABOLIC PANEL: CPT

## 2022-10-21 PROCEDURE — 83036 HEMOGLOBIN GLYCOSYLATED A1C: CPT

## 2022-10-24 DIAGNOSIS — R19.5 POSITIVE FIT (FECAL IMMUNOCHEMICAL TEST): Primary | ICD-10-CM

## 2022-11-02 ENCOUNTER — HOSPITAL ENCOUNTER (OUTPATIENT)
Dept: BONE DENSITY | Age: 57
Discharge: HOME | End: 2022-11-02
Attending: FAMILY MEDICINE
Payer: COMMERCIAL

## 2022-11-02 ENCOUNTER — OFFICE VISIT (OUTPATIENT)
Dept: OBGYN CLINIC | Facility: CLINIC | Age: 57
End: 2022-11-02
Payer: COMMERCIAL

## 2022-11-02 VITALS
DIASTOLIC BLOOD PRESSURE: 74 MMHG | WEIGHT: 232.19 LBS | SYSTOLIC BLOOD PRESSURE: 124 MMHG | HEIGHT: 65 IN | BODY MASS INDEX: 38.69 KG/M2

## 2022-11-02 DIAGNOSIS — N89.8 VAGINAL ODOR: ICD-10-CM

## 2022-11-02 DIAGNOSIS — Z87.42 HISTORY OF ABNORMAL CERVICAL PAP SMEAR: ICD-10-CM

## 2022-11-02 DIAGNOSIS — Z01.419 ENCOUNTER FOR ANNUAL ROUTINE GYNECOLOGICAL EXAMINATION: Primary | ICD-10-CM

## 2022-11-02 DIAGNOSIS — Z78.0 POSTMENOPAUSAL: ICD-10-CM

## 2022-11-02 PROCEDURE — 3078F DIAST BP <80 MM HG: CPT | Performed by: OBSTETRICS & GYNECOLOGY

## 2022-11-02 PROCEDURE — 77080 DXA BONE DENSITY AXIAL: CPT | Performed by: FAMILY MEDICINE

## 2022-11-02 PROCEDURE — 87624 HPV HI-RISK TYP POOLED RSLT: CPT | Performed by: OBSTETRICS & GYNECOLOGY

## 2022-11-02 PROCEDURE — 99396 PREV VISIT EST AGE 40-64: CPT | Performed by: OBSTETRICS & GYNECOLOGY

## 2022-11-02 PROCEDURE — 87205 SMEAR GRAM STAIN: CPT | Performed by: OBSTETRICS & GYNECOLOGY

## 2022-11-02 PROCEDURE — 3008F BODY MASS INDEX DOCD: CPT | Performed by: OBSTETRICS & GYNECOLOGY

## 2022-11-02 PROCEDURE — 87106 FUNGI IDENTIFICATION YEAST: CPT | Performed by: OBSTETRICS & GYNECOLOGY

## 2022-11-02 PROCEDURE — 87808 TRICHOMONAS ASSAY W/OPTIC: CPT | Performed by: OBSTETRICS & GYNECOLOGY

## 2022-11-02 PROCEDURE — 3074F SYST BP LT 130 MM HG: CPT | Performed by: OBSTETRICS & GYNECOLOGY

## 2022-11-03 ENCOUNTER — TELEPHONE (OUTPATIENT)
Dept: OBGYN CLINIC | Facility: CLINIC | Age: 57
End: 2022-11-03

## 2022-11-03 LAB — HPV I/H RISK 1 DNA SPEC QL NAA+PROBE: NEGATIVE

## 2022-11-03 RX ORDER — METRONIDAZOLE 500 MG/1
500 TABLET ORAL 2 TIMES DAILY
Qty: 14 TABLET | Refills: 0 | Status: SHIPPED | OUTPATIENT
Start: 2022-11-03 | End: 2022-11-10

## 2022-11-03 NOTE — TELEPHONE ENCOUNTER
Telephone call:    Called patient to review negative bacterial vaginosis and trichomonas screen. Offered patient metronidazole twice per day for 7 days given her symptoms of foul-smelling vaginal odor. Patient understood and elects to take metronidazole twice per day for 7 days. E prescribed to patient's preferred pharmacy. Dr. Joseph Barnett MD    [facility] 10 OBGYN     This note was created by [facility] voice recognition. Errors in content may be related to improper recognition by the system; efforts to review and correct have been done but errors may still exist. Please be advised the primary purpose of this note is for me to communicate medical care. Standard sentence structure is not always used. Medical terminology and medical abbreviations may be used. There may be grammatical, typographical, and automated fill ins that may have errors missed in proofreading.

## 2022-11-04 LAB
GENITAL VAGINOSIS SCREEN: NEGATIVE
TRICHOMONAS SCREEN: NEGATIVE

## 2022-11-22 ENCOUNTER — PATIENT MESSAGE (OUTPATIENT)
Dept: FAMILY MEDICINE CLINIC | Facility: CLINIC | Age: 57
End: 2022-11-22

## 2022-11-23 NOTE — TELEPHONE ENCOUNTER
From: Percy Hatch  Sent: 11/22/2022 7:02 PM CST  To: Em Rn Triage  Subject: Normal mammogram    Genevievelo Dr. Gabriella Pastor,  Thank you, have a great evening and a safe and Happy Thanksgiving.   Kind regards,  Jose L Beltran

## 2023-02-27 ENCOUNTER — PATIENT MESSAGE (OUTPATIENT)
Facility: CLINIC | Age: 58
End: 2023-02-27

## 2023-02-28 NOTE — TELEPHONE ENCOUNTER
Nazario Alcala RN 2/27/2023 7:15 PM CST        ----- Message -----  From: Yari Beckwith  Sent: 2/27/2023 7:01 PM CST  To: Em Triage Support  Subject: Tomas Wilde all is well. Per our previous conversation, in January, I had to change insurance to [employer]. The plan I have you are listed in tier 2 and in order for me to try to continue (no guarantee) to have you as my primary care physican; I need a letter indicating the medical necessity. If you able to provide a letter that would be great. If possible, can you email it to [email or web address] or put it in the portal.   Kind regards,

## 2023-04-26 NOTE — IMAGING NOTE
1412 Pt arrived to ultrasound room #1    1421 Scans taken by [facility] ultrasound  sonographer     1420 History taken and as follows:      1798 Procedure explained questions answered.     1420 Consent verified and obtained      1430  scans reviewed by  A (STENT ICAST 7MM 7FR 22MM 120CM CVR CATH INTRO SS PTFE - [id number]) balloon-expanding covered  stent was deployed in the left common iliac artery.   The stent was deployed at [address] [place] for 58 seconds at 4/26/2023 6:13 PM.

## (undated) DIAGNOSIS — Z12.11 COLON CANCER SCREENING: Primary | ICD-10-CM

## (undated) DIAGNOSIS — I10 PRIMARY HYPERTENSION: Primary | ICD-10-CM

## (undated) NOTE — MR AVS SNAPSHOT
[address]  at [place] [address]  [phone]               Thank you for choosing us for your health care visit with Lorenzo Palmer MD.  We are glad to serve you and happy to provide you with insurance company's guidelines for prior authorization for this test.  You may be held responsible for payment in full if proper authorization is not acquired.   Please contact the Patient Business Office at [phone] if you have   any questions related Specimen: ThinPrep Imager Screening Pap, Cervical/endocervical/vaginal                               Interpretation/Result       Negative for intraepithelial lesion or malignancy    Specimen Adequacy Satisfactory for evaluation.  Endocervical or metaplas Screened by the [address] [place] [address] and a cytotechnologist.\plain\f1\fs22\qlvq2096\hich\f1\dbch\f1\loch\f1\fs22\par}}           Procedure {\rtf1\framot65975\ansi\ttluddx2832\ftnbj\uc1\deff0{\fonttbl{\f0 \fswiss MS Sans Serif;}{\f1 \fswiss \fcharset0 MS MS Sans Serif;}{\f1 \fswiss \fcharset0 MS Sans Serif;}}{\colortbl ;\tar178\oshqo191\daxi835 ;\red0\green0\blue0 ;}{\stylesheet{\f0\fs20 Normal;}{\cs1 Default Paragraph Font;}}{\*\revtbl{Unknown;}}\zqyers17709\sylacc12467\doexp4786\oqjrj6916\kggaz0523\[facility] Don’t eat while when you’re bored.      EAT THESE FOODS MORE OFTEN: EAT THESE FOODS LESS OFTEN:   Make half your plate fruits and vegetables Highly refined, white starches including white bread, rice and pasta   Eat plenty of protein, keep the fat content l

## (undated) NOTE — LETTER
Niraj Roth, [address] [place] [address]       08/07/17        Patient: Gaby Akers   YOB: 1966   Date of Visit: 8/7/2017       Dear  Dr. Wade Pastor MD,      Thank you for referring Gaby Akers to [facility]

## (undated) NOTE — LETTER
Alec Khan, [address] [place] [address], [address]       08/07/17        Patient: Monika Cleaning   YOB: 1966   Date of Visit: 8/7/2017       Dear  Dr. Giancarlo Garcia MD,      Thank you for referring Monika Cleaning to my practice.

## (undated) NOTE — LETTER
Date & Time: 2/8/2020, 3:17 PM  Patient: Ellen Tracey  Encounter Provider(s):    Brianna Blackburn MD       To Whom It May Concern:    Samy Valenzuela was seen and treated in our department on 2/8/2020. She should not return to work until 2/17/20.  P

## (undated) NOTE — LETTER
Kate Busby, [address] [place] [address]       08/20/18        Patient: Fabiola Hanson   YOB: 1966   Date of Visit: 8/20/2018       Dear  Dr. Edmundo Bhatt MD,      Thank you for referring Fabiola Hanson to

## (undated) NOTE — LETTER
[address] [place] [address]  Authorization for Surgical Operation or Procedure  1.  I hereby authorize  _____ , my physician and the assistant, to perform the following operation and/or procedure:  ULTRASOUND GUIDED FINE NEEDLE ASPIRAT performed for the purposes of advancing medicine, science, and/or education, provided my identity is not revealed. If the procedure has been videotaped, the physician/surgeon will obtain the original videotape.  The hospital will not be responsible for stor treatment. I have also explained the risks and benefits involved in the refusal of the proposed treatment and have answered the patient's questions.  If I have a significant financial interest in a co-management agreement or a significant financial interest

## (undated) NOTE — MR AVS SNAPSHOT
After Visit Summary   6/16/2021    Reza Teixeira    MRN: [id number]           Visit Information     Date & Time  6/16/2021  8:00 AM Provider  Jackie Dallas, [address] [facility] [place] best Dept.  Phone  [phone], Monday through Friday between 7:30am to 6pm and on Saturday between 8am and 1pm. Online scheduling is available at [email or web address]. Evening and weekend appointments for your exam   are available.      It is the patient's responsibility t treatment plan within a few hours.  ONLINE VISIT  Primary Care Providers  Treatment for mild illness or injury that does not require immediate attention VIDEO VISITS  Average cost  $35*    e-VISTS  Average cost  $35*     SAME DAY APPOINTMENTS   Available at

## (undated) NOTE — LETTER
Heather Donahue, :1966    CONSENT FOR PROCEDURE/SEDATION    1. I authorize the performance upon Heather Donahue  the following: Endometrial biopsy procedure    2.  I authorize Dr. Emilie Hendrickson MD (and whomever is designated as the doctor’s ass ____________________________________________    Witness: _________________________________________ Date:___________     Physician Signature: _______________________________ Date:___________

## (undated) NOTE — LETTER
2017              Dr. Ann Saini       Dear Dr. Vahe Flor:    I reviewed Pernell Padron's ( 1966) scan images, her ultrasound and CT reports which suggest some lymphadenopathy which is c

## (undated) NOTE — MR AVS SNAPSHOT
After Visit Summary   4/20/2017    Fabiola Hanson    MRN: [id number]           Visit Information        Provider Department Dept Phone    4/20/2017  2:30 PM Raudel Corea MD [address] [facility] [phone]      Your Vitals Were     BP Ht Wt BMI It is the patient's responsibility to check with and follow their insurance company's guidelines for prior authorization for this test.  You may be held responsible for payment in full if proper authorization is not acquired.   Please contact the Patient Bu If you have questions, you can call [phone] to talk to our [address] staff. Remember, [employer] is NOT to be used for urgent needs. For medical emergencies, dial 911.       Sincerely,      José Manuel Haines MD             [facility] now offers Video Visits throug

## (undated) NOTE — MR AVS SNAPSHOT
After Visit Summary   8/17/2020    Williamson June    MRN: [id number]           Visit Information     Date & Time  8/17/2020  1:00 PM Provider  Cherise Foley, [address] [facility], [facility] Dept.  Phone  33 CLIVE SCREENING BILAT ([id number])    Instructions:   To schedule a test at any [facility], call Central Scheduling at [phone], Monday through Friday between 7:30am to 6pm and on Saturday between 8am and 1pm. Online scheduling active are less likely to develop some chronic diseases than adults who are inactive.      HOW TO GET STARTED: HOW TO STAY MOTIVATED:   Start activities slowly and build up over time Do what you like   Get your heart pumping – brisk walking, biking, swimmin Primary Care Providers  Treatment for mild illness or injury that does not require immediate attention.  Average cost  $70*   [facility]  Monday – Friday  8:00 am – 8:00 pm   Saturday – Sunday  8:00 am – 4:00 pm    [facility]